# Patient Record
Sex: MALE | Race: WHITE | NOT HISPANIC OR LATINO | URBAN - METROPOLITAN AREA
[De-identification: names, ages, dates, MRNs, and addresses within clinical notes are randomized per-mention and may not be internally consistent; named-entity substitution may affect disease eponyms.]

---

## 2019-11-05 RX ORDER — VALSARTAN AND HYDROCHLOROTHIAZIDE 320; 25 MG/1; MG/1
TABLET, FILM COATED ORAL
Qty: 30 TABLET | Refills: 0 | Status: SHIPPED | OUTPATIENT
Start: 2019-11-05 | End: 2020-01-06 | Stop reason: SDUPTHER

## 2019-11-05 NOTE — TELEPHONE ENCOUNTER
Electronic refill request for Diovan HCT.  Medicine not in [facility] but confirmed in [facility].  Patient's last OV with us was 6/1/18.  No future appt scheduled.  Provider approval pending

## 2020-01-03 RX ORDER — VALSARTAN AND HYDROCHLOROTHIAZIDE 320; 25 MG/1; MG/1
1 TABLET, FILM COATED ORAL DAILY
Qty: 30 TABLET | Refills: 0 | OUTPATIENT
Start: 2020-01-03

## 2020-01-03 NOTE — TELEPHONE ENCOUNTER
Electronic refill request for [facility].  Medicine not in [facility] but confirmed in [facility].  Patient's last OV with us was 6/1/18.  No future appt scheduled.  Provider approval pending.  Note this medication was refilled 11/5/19 and was told needed appt before further refills.

## 2020-01-06 ENCOUNTER — TELEPHONE (OUTPATIENT)
Dept: INTERNAL MEDICINE | Facility: CLINIC | Age: 45
End: 2020-01-06

## 2020-01-06 RX ORDER — VALSARTAN AND HYDROCHLOROTHIAZIDE 320; 25 MG/1; MG/1
1 TABLET, FILM COATED ORAL DAILY
Qty: 30 TABLET | Refills: 0 | Status: SHIPPED | OUTPATIENT
Start: 2020-01-06

## 2020-01-06 NOTE — TELEPHONE ENCOUNTER
Patient wanted to know if enough of the following medication can be called in for him until his appointment next week:    valsartan-hydrochlorothiazide (DIOVAN-HCT) 320-25 MG per tablet 30 tablet 0 11/5/2019     Sig: take 1 tablet by mouth once daily                Pharmacy     [id number] [place], [place] - [address] AT [facility] [address] [facility] - [phone] [facility] [phone]

## 2020-01-07 PROBLEM — E66.9 OBESITY, UNSPECIFIED: Status: ACTIVE | Noted: 2020-01-07

## 2020-01-07 PROBLEM — I25.10 ARTERIOSCLEROTIC CARDIOVASCULAR DISEASE (ASCVD): Status: ACTIVE | Noted: 2020-01-07

## 2020-01-07 PROBLEM — E78.2 MIXED HYPERLIPIDEMIA: Status: ACTIVE | Noted: 2020-01-07

## 2020-01-07 PROBLEM — I10 BENIGN ESSENTIAL HYPERTENSION: Status: ACTIVE | Noted: 2020-01-07

## 2020-01-07 PROBLEM — E66.01 SEVERE OBESITY WITH BODY MASS INDEX (BMI) OF 35.0 TO 39.9 WITH COMORBIDITY (HCC): Status: ACTIVE | Noted: 2020-01-07

## 2020-01-07 PROBLEM — I21.4 ACUTE NON-ST ELEVATION MYOCARDIAL INFARCTION (NSTEMI) (HCC): Status: ACTIVE | Noted: 2020-01-07
